# Patient Record
Sex: MALE | Race: WHITE | Employment: OTHER | URBAN - METROPOLITAN AREA
[De-identification: names, ages, dates, MRNs, and addresses within clinical notes are randomized per-mention and may not be internally consistent; named-entity substitution may affect disease eponyms.]

---

## 2022-10-29 ENCOUNTER — HOSPITAL ENCOUNTER (OUTPATIENT)
Age: 32
Discharge: HOME | End: 2022-10-29
Attending: EMERGENCY MEDICINE
Payer: COMMERCIAL

## 2022-10-29 VITALS
DIASTOLIC BLOOD PRESSURE: 95 MMHG | TEMPERATURE: 98 F | HEART RATE: 80 BPM | OXYGEN SATURATION: 98 % | RESPIRATION RATE: 20 BRPM | SYSTOLIC BLOOD PRESSURE: 130 MMHG | BODY MASS INDEX: 34.72 KG/M2 | HEIGHT: 71 IN | WEIGHT: 248 LBS

## 2022-10-29 DIAGNOSIS — S39.012A LUMBAR STRAIN, INITIAL ENCOUNTER: Primary | ICD-10-CM

## 2022-10-29 PROCEDURE — 99203 OFFICE O/P NEW LOW 30 MIN: CPT

## 2022-10-29 RX ORDER — CYCLOBENZAPRINE HCL 10 MG
10 TABLET ORAL 3 TIMES DAILY PRN
Qty: 20 TABLET | Refills: 0 | Status: SHIPPED | OUTPATIENT
Start: 2022-10-29 | End: 2022-11-05

## 2022-10-29 RX ORDER — LIDOCAINE 50 MG/G
2 PATCH TOPICAL EVERY 24 HOURS
Qty: 14 PATCH | Refills: 0 | Status: SHIPPED | OUTPATIENT
Start: 2022-10-29 | End: 2022-11-05

## 2022-10-29 NOTE — DISCHARGE INSTRUCTIONS
Take Motrin, Tylenol. And the lidocaine patches. If you still having pain start the Flexeril. Follow-up with a primary care physician return if any numbness weakness loss of urine or bowel.

## 2022-10-29 NOTE — ED INITIAL ASSESSMENT (HPI)
Patient reports lower back pain since Thursday afternoon, stating it feels like it \"locks up on him\". Reports cough, cold, fever Mon-Wed, resolving at this time. Reports pain worse with movements and bending, all across lower back. Taking advil for pain. Reports having similar symptoms 2 months ago. Reports pain can radiate down into hips and legs at times with numbness/tingling feeling.

## 2024-12-03 ENCOUNTER — APPOINTMENT (OUTPATIENT)
Dept: ULTRASOUND IMAGING | Age: 34
End: 2024-12-03
Attending: EMERGENCY MEDICINE
Payer: COMMERCIAL

## 2024-12-03 ENCOUNTER — HOSPITAL ENCOUNTER (EMERGENCY)
Age: 34
Discharge: HOME | End: 2024-12-03
Attending: EMERGENCY MEDICINE
Payer: COMMERCIAL

## 2024-12-03 VITALS
OXYGEN SATURATION: 99 % | BODY MASS INDEX: 35.84 KG/M2 | RESPIRATION RATE: 16 BRPM | TEMPERATURE: 98 F | HEART RATE: 77 BPM | WEIGHT: 256 LBS | DIASTOLIC BLOOD PRESSURE: 94 MMHG | SYSTOLIC BLOOD PRESSURE: 146 MMHG | HEIGHT: 71 IN

## 2024-12-03 DIAGNOSIS — I82.442 ACUTE DEEP VEIN THROMBOSIS (DVT) OF LEFT TIBIAL VEIN (HCC): Primary | ICD-10-CM

## 2024-12-03 LAB
ALBUMIN SERPL-MCNC: 4.6 G/DL (ref 3.2–4.8)
ALBUMIN/GLOB SERPL: 1.6 {RATIO} (ref 1–2)
ALP LIVER SERPL-CCNC: 92 U/L
ALT SERPL-CCNC: 68 U/L
ANION GAP SERPL CALC-SCNC: 3 MMOL/L (ref 0–18)
APTT PPP: 28.9 SECONDS (ref 23–36)
AST SERPL-CCNC: 40 U/L (ref ?–34)
BASOPHILS # BLD AUTO: 0.04 X10(3) UL (ref 0–0.2)
BASOPHILS NFR BLD AUTO: 0.4 %
BILIRUB SERPL-MCNC: 0.3 MG/DL (ref 0.3–1.2)
BUN BLD-MCNC: 17 MG/DL (ref 9–23)
CALCIUM BLD-MCNC: 9.9 MG/DL (ref 8.7–10.4)
CHLORIDE SERPL-SCNC: 107 MMOL/L (ref 98–112)
CO2 SERPL-SCNC: 28 MMOL/L (ref 21–32)
CREAT BLD-MCNC: 1.08 MG/DL
EGFRCR SERPLBLD CKD-EPI 2021: 92 ML/MIN/1.73M2 (ref 60–?)
EOSINOPHIL # BLD AUTO: 0.08 X10(3) UL (ref 0–0.7)
EOSINOPHIL NFR BLD AUTO: 0.7 %
ERYTHROCYTE [DISTWIDTH] IN BLOOD BY AUTOMATED COUNT: 12.6 %
GLOBULIN PLAS-MCNC: 2.8 G/DL (ref 2–3.5)
GLUCOSE BLD-MCNC: 107 MG/DL (ref 70–99)
HCT VFR BLD AUTO: 46.9 %
HGB BLD-MCNC: 16.3 G/DL
IMM GRANULOCYTES # BLD AUTO: 0.03 X10(3) UL (ref 0–1)
IMM GRANULOCYTES NFR BLD: 0.3 %
INR BLD: 0.9 (ref 0.8–1.2)
LYMPHOCYTES # BLD AUTO: 3.87 X10(3) UL (ref 1–4)
LYMPHOCYTES NFR BLD AUTO: 36 %
MCH RBC QN AUTO: 31.1 PG (ref 26–34)
MCHC RBC AUTO-ENTMCNC: 34.8 G/DL (ref 31–37)
MCV RBC AUTO: 89.5 FL
MONOCYTES # BLD AUTO: 0.85 X10(3) UL (ref 0.1–1)
MONOCYTES NFR BLD AUTO: 7.9 %
NEUTROPHILS # BLD AUTO: 5.89 X10 (3) UL (ref 1.5–7.7)
NEUTROPHILS # BLD AUTO: 5.89 X10(3) UL (ref 1.5–7.7)
NEUTROPHILS NFR BLD AUTO: 54.7 %
OSMOLALITY SERPL CALC.SUM OF ELEC: 288 MOSM/KG (ref 275–295)
PLATELET # BLD AUTO: 266 10(3)UL (ref 150–450)
POTASSIUM SERPL-SCNC: 3.6 MMOL/L (ref 3.5–5.1)
PROT SERPL-MCNC: 7.4 G/DL (ref 5.7–8.2)
PROTHROMBIN TIME: 12 SECONDS (ref 11.6–14.8)
RBC # BLD AUTO: 5.24 X10(6)UL
SODIUM SERPL-SCNC: 138 MMOL/L (ref 136–145)
WBC # BLD AUTO: 10.8 X10(3) UL (ref 4–11)

## 2024-12-03 PROCEDURE — 85610 PROTHROMBIN TIME: CPT | Performed by: PHYSICIAN ASSISTANT

## 2024-12-03 PROCEDURE — 99284 EMERGENCY DEPT VISIT MOD MDM: CPT

## 2024-12-03 PROCEDURE — 80053 COMPREHEN METABOLIC PANEL: CPT | Performed by: PHYSICIAN ASSISTANT

## 2024-12-03 PROCEDURE — 99285 EMERGENCY DEPT VISIT HI MDM: CPT

## 2024-12-03 PROCEDURE — 85025 COMPLETE CBC W/AUTO DIFF WBC: CPT | Performed by: PHYSICIAN ASSISTANT

## 2024-12-03 PROCEDURE — 85730 THROMBOPLASTIN TIME PARTIAL: CPT | Performed by: PHYSICIAN ASSISTANT

## 2024-12-03 PROCEDURE — 36415 COLL VENOUS BLD VENIPUNCTURE: CPT

## 2024-12-03 PROCEDURE — 93971 EXTREMITY STUDY: CPT | Performed by: EMERGENCY MEDICINE

## 2024-12-04 ENCOUNTER — TELEPHONE (OUTPATIENT)
Dept: HEMATOLOGY/ONCOLOGY | Facility: HOSPITAL | Age: 34
End: 2024-12-04

## 2024-12-04 NOTE — ED INITIAL ASSESSMENT (HPI)
Pt c/o pain behind left knee. Had similar episode 2 months ago that resolved on it's own.  Pain returned about 1 week ago.  Pt denies specific injury. Pain radiates down to calf at times.

## 2024-12-04 NOTE — DISCHARGE INSTRUCTIONS
Follow directions for new blood thinner closely.  Referral to both primary care physician and hematologist to arrange follow-up.  For any acute chest pain or shortness of breath return to the ER.

## 2024-12-04 NOTE — ED PROVIDER NOTES
Patient Seen in: [facility] [place]      History     Chief Complaint   Patient presents with    Leg Pain     Stated Complaint: Left leg pain - denies injury - 4 200mg ibuprofen at 1830    Subjective:   HPI      34-year-old male.  No significant medical history.  Someday smoker.  Admits that he has not seen a point in his adult life.  2 months prior to arrival the patient had 1 to 2 weeks of left calf pain.  Nonspecific.  Eventually resolved.  However, symptoms returned these last 10 days.  Again, has no obvious explanation.  No injury.  No recent travel.  Denies any chest pain or shortness of breath.    Objective:     History reviewed. No pertinent past medical history.           History reviewed. No pertinent surgical history.             Social History     Socioeconomic History    Marital status:    Tobacco Use    Smoking status: Never     Passive exposure: Never    Smokeless tobacco: Never   Vaping Use    Vaping status: Some Days   Substance and Sexual Activity    Alcohol use: Yes     Comment: occasional    Drug use: Never                  Physical Exam     ED Triage Vitals [12/03/24 2019]   BP (!) 158/98   Pulse 84   Resp 18   Temp 97.7 °F (36.5 °C)   Temp src Temporal   SpO2 97 %   O2 Device None (Room air)       Current Vitals:   Vital Signs  BP: (!) 132/104  Pulse: 81  Resp: 18  Temp: 97.7 °F (36.5 °C)  Temp src: Temporal    Oxygen Therapy  SpO2: 99 %  O2 Device: None (Room air)        Physical Exam     Gen: Well appearing, well groomed, alert and aware x 3  Neck: Supple, full range of motion  Eye examination: EOMs are intact, normal conjunctival  ENT: Atraumatic  Lung: No distress, RR, no retraction  Extremities: Subtle palpable fullness through the left medial calf without erythema or induration.  Strong dorsal pedis pulse.    ED Course     Labs Reviewed   COMP METABOLIC PANEL (14) - Abnormal; Notable for the following components:       Result Value    Glucose 107 (*)     AST  40 (*)     ALT 68 (*)     All other components within normal limits   PTT, ACTIVATED - Normal   PROTHROMBIN TIME (PT) - Normal   CBC WITH DIFFERENTIAL WITH PLATELET     US VENOUS DOPPLER LEG LEFT - DIAG IMG (CPT=93971)    Result Date: 12/3/2024  PROCEDURE:  US VENOUS DOPPLER LEG LEFT - DIAG IMG (CPT=93971)  COMPARISON:  None.  INDICATIONS:  eval for DVT  TECHNIQUE:  Real time, grey scale, and duplex ultrasound was used to evaluate the lower extremity venous system. B-mode two-dimensional images of the vascular structures, Doppler spectral analysis, and color flow.  Doppler imaging were performed.  The following veins were imaged:  Common, deep, and superficial femoral, popliteal, sapheno-femoral junction, posterior tibial veins, and the contralateral common femoral vein.  PATIENT STATED HISTORY: (As transcribed by Technologist)  Patient presents with left leg pain behind the knee for a few months.    FINDINGS:  EXTREMITY EXAMINED:  Left lower extremity SAPHENOFEMORAL JUNCTION:  No reflux. THROMBI:  Occlusive thrombus in 1 of the paired posterior tibial veins in the left lower extremity is noted.  There is no evidence of DVT in the left superficial and popliteal veins.            CONCLUSION:  Deep vein thrombosis in 1 of the paired left posterior tibial veins.  This report was communicated by telephone to Dr. Pierre at the dictation time shown below.   LOCATION:  [facility]    Dictated by (CST): Branden Cooper MD on 12/03/2024 at 9:35 PM     Finalized by (CST): Branden Cooper MD on 12/03/2024 at 9:36 PM             MDM    My supervising physician was involved in the management of this patient.    Blood pressure 139/109.  Afebrile.  No tachycardia.  No complaint of chest pain shortness of breath    Patient sent for ultrasound left lower extremity    CONCLUSION:  Deep vein thrombosis in 1 of the paired left posterior tibial veins.  This report was communicated by telephone to Dr. Pierre at the dictation time shown below.    LOCATION:  [facility]    Dictated by (CST): Branden Cooper MD on 12/03/2024 at 9:35 PM     Finalized by (CST): Branden Cooper MD on 12/03/2024 at 9:36 PM       Ultrasound as above.  Notified supervising physician.  CBC, CMP and coags.  We will initiate anticoagulants and refer her to both primary care physician and hematology     CBC benign    AST and ALT subtly elevated    Glucose 107    Coags within normal limits    Xarelto starter pack    Follow directions for new blood thinner closely.  Referral to both primary care physician and hematologist to arrange follow-up.  For any acute chest pain or shortness of breath return to the ER.    Medical Decision Making      Disposition and Plan     Clinical Impression:  1. Acute deep vein thrombosis (DVT) of left tibial vein (HCC)         Disposition:  Discharge  12/3/2024 10:58 pm    Follow-up:  Rajeev Cool MD  [address] [facility] [id number] [phone]    Follow up      Lyndsay Torrez MD  [address] [place] [id number] [phone]                Medications Prescribed:  Current Discharge Medication List        START taking these medications    Details   rivaroxaban 15 & 20 MG Oral Tablet Therapy Pack Days 1-21: 15 mg by mouth twice daily, Days 22-30: 20 mg by mouth once daily  Qty: 1 each, Refills: 0                 Supplementary Documentation:

## 2024-12-04 NOTE — TELEPHONE ENCOUNTER
New consult for Acute deep vein thrombosis (DVT) of left tibial vein (HCC). Patient was seen in ED on 12/3 and was told to follow up with Dr. Torrez. Please call patient to schedule new consult appointment at [phone]. Called 12/4/24 GA

## 2024-12-06 ENCOUNTER — HOSPITAL ENCOUNTER (EMERGENCY)
Facility: HOSPITAL | Age: 34
Discharge: HOME | End: 2024-12-06
Attending: EMERGENCY MEDICINE
Payer: COMMERCIAL

## 2024-12-06 VITALS
TEMPERATURE: 98 F | HEIGHT: 71 IN | OXYGEN SATURATION: 97 % | WEIGHT: 256 LBS | SYSTOLIC BLOOD PRESSURE: 140 MMHG | HEART RATE: 91 BPM | DIASTOLIC BLOOD PRESSURE: 98 MMHG | BODY MASS INDEX: 35.84 KG/M2 | RESPIRATION RATE: 18 BRPM

## 2024-12-06 DIAGNOSIS — I82.432 ACUTE DEEP VEIN THROMBOSIS (DVT) OF POPLITEAL VEIN OF LEFT LOWER EXTREMITY (HCC): Primary | ICD-10-CM

## 2024-12-06 PROCEDURE — 99283 EMERGENCY DEPT VISIT LOW MDM: CPT

## 2024-12-06 PROCEDURE — 99284 EMERGENCY DEPT VISIT MOD MDM: CPT

## 2024-12-06 RX ORDER — HYDROCODONE BITARTRATE AND ACETAMINOPHEN 5; 325 MG/1; MG/1
1 TABLET ORAL EVERY 6 HOURS PRN
Qty: 10 TABLET | Refills: 0 | Status: SHIPPED | OUTPATIENT
Start: 2024-12-06 | End: 2024-12-16

## 2024-12-06 NOTE — ED QUICK NOTES
Patient reports with a DVT to left leg, patient is taking his blood thinner, denies chest pain or shortness of breath, reports the pain is severe.

## 2024-12-06 NOTE — ED INITIAL ASSESSMENT (HPI)
Pt was at the PED on Tuesday and was Dx'ed with a DVT. Today the pain in his left leg is so bad that he called off work and he could not sleep last night. Pt denies CP and REGGIE. Pt did say that he had the taste of blood in his mouth this morning, and when he spit it out is was a little pink tinge to it.

## 2024-12-06 NOTE — ED PROVIDER NOTES
Patient Seen in: [facility] Emergency Department      History     Chief Complaint   Patient presents with    Pain     Stated Complaint: left lower extremity DVT diagnosed on Tuesday, patient on Xarelto BID. C/O pain*    Subjective:   HPI      This is a 34-year-old male no significant past medical history who was diagnosed on 12/3/2024 with a left leg DVT in the left posterior tibial vein.  He was seen in the emergency room and started on Xarelto.  Patient states he has pain in the left leg where the DVT is and he is having trouble sleeping.  He is a very active gentleman.  He works at [employer].  He is on his feet all day.  He has had no recent travel.  He thinks there might be some type of family history of clotting in his grandfather but he is not really sure.  He is already made an appointment with Dr. Torrez from hematology for December 17.  He was taking Tylenol sporadically for pain.  He presents here now for further evaluation.    Objective:     Past Medical History:    DVT (deep venous thrombosis) (HCC)              History reviewed. No pertinent surgical history.             Social History     Socioeconomic History    Marital status:    Tobacco Use    Smoking status: Never     Passive exposure: Never    Smokeless tobacco: Never   Vaping Use    Vaping status: Some Days   Substance and Sexual Activity    Alcohol use: Yes     Comment: occasional    Drug use: Not Currently     Types: Cannabis                  Physical Exam     ED Triage Vitals [12/06/24 0937]   BP (!) 155/99   Pulse 75   Resp 18   Temp 97.7 °F (36.5 °C)   Temp src Temporal   SpO2 97 %   O2 Device None (Room air)       Current Vitals:   Vital Signs  BP: (!) 140/98  Pulse: 91  Resp: 18  Temp: 97.7 °F (36.5 °C)  Temp src: Temporal  MAP (mmHg): (!) 110    Oxygen Therapy  SpO2: 97 %  O2 Device: None (Room air)        Physical Exam        GENERAL: Awake, alert oriented x3, nontoxic appearing.   SKIN: Normal, warm, and dry.  Lungs: Clear to  auscultation bilaterally with no rales, no retractions, and no wheezing.  HEART:  Regular rate and rhythm. S1 and S2. No murmurs, no rubs or gallops.   ABDOMEN: Soft, nontender and nondistended. Normoactive bowel sounds. No rebound. No guarding.   EXTREMITIES: Tenderness over the left popliteal fossa and proximal calf.  Left leg is slightly more edematous than right.  Warm with brisk capillary refill.                     ED Course   Labs Reviewed - No data to display                MDM           34-year-old male otherwise healthy presents with known DVT in left leg with some calf pain.    Discussed supportive care.  He can also take Norco at bedtime.  Return if worsening symptoms chest pain or shortness of breath.  Follow-up with Dr. Torrez as scheduled.  Patient was discharged home in good condition.        Disposition and Plan     Clinical Impression:  1. Acute deep vein thrombosis (DVT) of popliteal vein of left lower extremity (HCC)         Disposition:  Discharge  12/6/2024  9:51 am    Follow-up:  Lyndsay Torrez MD  [address]   [facility] [id number] [phone]    Follow up on 12/17/2024      Paige Kincaid DO  [address]  [facility] [address] [phone]    Follow up in 1 week(s)            Medications Prescribed:  Current Discharge Medication List        START taking these medications    Details   HYDROcodone-acetaminophen 5-325 MG Oral Tab Take 1 tablet by mouth every 6 (six) hours as needed for Pain.  Qty: 10 tablet, Refills: 0    Associated Diagnoses: Acute deep vein thrombosis (DVT) of popliteal vein of left lower extremity (HCC)                 Supplementary Documentation:

## 2024-12-06 NOTE — DISCHARGE INSTRUCTIONS
Continue Xarelto as prescribed  Tylenol for pain every 4-6 hours  Norco at bedtime with 1 Tylenol  Warm compresses topically or heat patches for discomfort  Elevate legs when you can  Lidocaine patches topically  Return if worse if you have chest pain or shortness of breath  Follow-up with hematology as scheduled

## 2024-12-12 ENCOUNTER — OFFICE VISIT (OUTPATIENT)
Dept: FAMILY MEDICINE CLINIC | Facility: CLINIC | Age: 34
End: 2024-12-12
Payer: COMMERCIAL

## 2024-12-12 VITALS
HEIGHT: 71 IN | RESPIRATION RATE: 18 BRPM | HEART RATE: 78 BPM | TEMPERATURE: 99 F | SYSTOLIC BLOOD PRESSURE: 136 MMHG | OXYGEN SATURATION: 99 % | WEIGHT: 265 LBS | BODY MASS INDEX: 37.1 KG/M2 | DIASTOLIC BLOOD PRESSURE: 84 MMHG

## 2024-12-12 DIAGNOSIS — I82.442 ACUTE DEEP VEIN THROMBOSIS (DVT) OF TIBIAL VEIN OF LEFT LOWER EXTREMITY (HCC): Primary | ICD-10-CM

## 2024-12-12 DIAGNOSIS — Z28.21 REFUSED INFLUENZA VACCINE: ICD-10-CM

## 2024-12-12 PROBLEM — M54.42 LUMBAGO WITH SCIATICA, LEFT SIDE: Status: RESOLVED | Noted: 2024-12-12 | Resolved: 2024-12-12

## 2024-12-12 PROCEDURE — 99203 OFFICE O/P NEW LOW 30 MIN: CPT | Performed by: FAMILY MEDICINE

## 2024-12-12 NOTE — PATIENT INSTRUCTIONS
Continue the Xarelto as prescribed.    Elevate your leg when sitting.     Stay active during the day.    Keep your appointment with the hematologist as scheduled on 1/6/2025.    If you develop a nose bleed or have a cut which won't stop bleeding after 30 minutes of direct pressure, you have blood in the urine or stool, you develop sudden chest pain or shortness of breath or you fall and hit your head, you must be seen in an ER.    Consider getting your flu shot and COVID booster at your local pharmacy.    See me in 4 weeks for your annual physical.

## 2024-12-12 NOTE — PROGRESS NOTES
The 21st Century Cures Act makes medical notes like these available to patients in the interest of transparency. Please be advised this is a medical document. Medical documents are intended to carry relevant information, facts as evident, and the clinical opinion of the practitioner. The medical note is intended as peer to peer communication and may appear blunt or direct. It is written in medical language and may contain abbreviations or verbiage that are unfamiliar.       Leonardo Escobar is a 34 year old male.    HPI:     Chief Complaint   Patient presents with    ER F/U       This 34-year-old male who is a new patient to my practice presents to the office for follow-up of an ER visit dated 12/3/2024 and 12/6/2024.  On 12/3/2024, the patient presented with left calf pain.  He states prior to the emergency room visit, he had had left calf pain for 1 to 2 weeks for about 2 months.  The patient states the pain resolved and then recurred about 10 days prior to his ER visit.  He denied any injury.  There was no associated prolonged travel history or surgery.  He had no associated chest pain or shortness of breath.  He had no prior history for DVT and there is no family history for DVT or PE.  Ultrasound done on that date showed DVT in one of the paired left posterior tibial veins.  The patient was started on Xarelto.  He presented to the emergency room again on 12/6/2024 with worsening left calf pain and difficulty sleeping.  The patient was supposed to be seen by hematology on 12/17/2024.  Assessment showed no significant change in his exam and he was discharged to home with [place].  The patient states since his discharge on 12/6/2024, the pain has improved.  He is taking Tylenol for pain.  He states he has not used any of the Norco.  He is applying a lidocaine patch and he has been elevating his leg.  He works at [employer] and does a lot of walking.  He is still denying any chest pain, shortness of breath or any calf  swelling or redness.    HISTORY:  Past Medical History:    DVT (deep venous thrombosis) ([facility])    Lumbago with sciatica, left side      History reviewed. No pertinent surgical history.   Family History   Problem Relation Age of Onset    Diabetes Mother     Other (Other) Father         gout    Heart Disease Maternal Grandfather       Social History:   Social History     Socioeconomic History    Marital status:    Tobacco Use    Smoking status: Never     Passive exposure: Never    Smokeless tobacco: Never   Vaping Use    Vaping status: Some Days   Substance and Sexual Activity    Alcohol use: Yes     Comment: occasional    Drug use: Not Currently     Types: Cannabis    Sexual activity: Yes        Medications (Active prior to today's visit):  Current Outpatient Medications   Medication Sig Dispense Refill    HYDROcodone-acetaminophen 5-325 MG Oral Tab Take 1 tablet by mouth every 6 (six) hours as needed for Pain. 10 tablet 0    rivaroxaban 15 & 20 MG Oral Tablet Therapy Pack Days 1-21: 15 mg by mouth twice daily, Days 22-30: 20 mg by mouth once daily 1 each 0       Allergies:  Allergies[1]    ROS:     Pertinent positives and pertinent negatives are as listed in HPI.    All other review of symptoms were reviewed and negative.    PHYSICAL EXAM:   /84 (BP Location: Left arm, Patient Position: Sitting, Cuff Size: large)   Pulse 78   Temp 99 °F (37.2 °C)   Resp 18   Ht 5' 11\" (1.803 m)   Wt 265 lb (120.2 kg)   SpO2 99%   BMI 36.96 kg/m²     Wt Readings from Last 3 Encounters:   12/12/24 265 lb (120.2 kg)   12/06/24 256 lb (116.1 kg)   12/03/24 256 lb (116.1 kg)       BP Readings from Last 3 Encounters:   12/12/24 136/84   12/06/24 (!) 140/98   12/03/24 (!) 146/94       General: Obese, well hydrated. No acute distress. No pallor.   HEENT: Normocephalic, atraumatic.  RODRIGUE, EOMI, Sclera clear and non icteric bilaterally. TM's normal, nose without congestion, pharynx without redness or exudates. Moist mucous  membranes.  Neck: Supple. No lymphadenopathy. No thyromegaly. No bruits noted.  Heart: RRR without S3 or S4 or murmur.  Lungs: Clear to auscultation bilaterally. No rales, rhonchi or wheezes. No tachypnea or retractions noted.  Abdomen: Soft, nontender, nondistended, normal bowel sounds. No organomegaly. No guarding, rigidity or rebound noted.  Extremities: No edema bilaterally.  The patient has no left calf redness or swelling.  No Homans' sign.  Some mild tenderness to palpation.  Skin: Warm and dry.  Neuro: Alert and oriented x 3, normal gait.  Psych: Normal mood and affect.     ASSESSMENT/PLAN:   34 year old male with    LABS This Visit:    Results for orders placed or performed during the hospital encounter of 12/03/24   CBC With Differential With Platelet    Collection Time: 12/03/24  9:56 PM   Result Value Ref Range    WBC 10.8 4.0 - 11.0 x10(3) uL    RBC 5.24 4.30 - 5.70 x10(6)uL    HGB 16.3 13.0 - 17.5 g/dL    HCT 46.9 39.0 - 53.0 %    .0 150.0 - 450.0 10(3)uL    MCV 89.5 80.0 - 100.0 fL    MCH 31.1 26.0 - 34.0 pg    MCHC 34.8 31.0 - 37.0 g/dL    RDW 12.6 %    Neutrophil Absolute Prelim 5.89 1.50 - 7.70 x10 (3) uL    Neutrophil Absolute 5.89 1.50 - 7.70 x10(3) uL    Lymphocyte Absolute 3.87 1.00 - 4.00 x10(3) uL    Monocyte Absolute 0.85 0.10 - 1.00 x10(3) uL    Eosinophil Absolute 0.08 0.00 - 0.70 x10(3) uL    Basophil Absolute 0.04 0.00 - 0.20 x10(3) uL    Immature Granulocyte Absolute 0.03 0.00 - 1.00 x10(3) uL    Neutrophil % 54.7 %    Lymphocyte % 36.0 %    Monocyte % 7.9 %    Eosinophil % 0.7 %    Basophil % 0.4 %    Immature Granulocyte % 0.3 %   Comp Metabolic Panel (14)    Collection Time: 12/03/24  9:56 PM   Result Value Ref Range    Glucose 107 (H) 70 - 99 mg/dL    Sodium 138 136 - 145 mmol/L    Potassium 3.6 3.5 - 5.1 mmol/L    Chloride 107 98 - 112 mmol/L    CO2 28.0 21.0 - 32.0 mmol/L    Anion Gap 3 0 - 18 mmol/L    BUN 17 9 - 23 mg/dL    Creatinine 1.08 0.70 - 1.30 mg/dL    Calcium,  Total 9.9 8.7 - 10.4 mg/dL    Calculated Osmolality 288 275 - 295 mOsm/kg    eGFR-Cr 92 >=60 mL/min/1.73m2    AST 40 (H) <34 U/L    ALT 68 (H) 10 - 49 U/L    Alkaline Phosphatase 92 45 - 117 U/L    Bilirubin, Total 0.3 0.3 - 1.2 mg/dL    Total Protein 7.4 5.7 - 8.2 g/dL    Albumin 4.6 3.2 - 4.8 g/dL    Globulin  2.8 2.0 - 3.5 g/dL    A/G Ratio 1.6 1.0 - 2.0   PTT, Activated    Collection Time: 12/03/24  9:56 PM   Result Value Ref Range    PTT 28.9 23.0 - 36.0 seconds   Prothrombin Time (PT)    Collection Time: 12/03/24  9:56 PM   Result Value Ref Range    PT 12.0 11.6 - 14.8 seconds    INR 0.90 0.80 - 1.20        1. Acute deep vein thrombosis (DVT) of tibial vein of left lower extremity (HCC)    I discussed management of DVT with the patient.  The patient is to continue with his Xarelto.  The patient should keep his appointment as scheduled with hematology on 1/6/2025.  He was reminded he cannot take any NSAIDs or aspirin while he is on Xarelto.  He may continue with the Tylenol and lidocaine patches as needed for his pain.  He should continue to elevate the leg when sitting.  He was instructed to go to the emergency room if he is having worsening calf pain or swelling, if he has a nosebleed or a cut which does not stop bleeding after 30 minutes of direct pressure, he has blood in the urine or stool, sudden onset of chest pain or shortness of breath, or if he falls and hits his head.    2. Refused influenza vaccine    The patient declined his flu shot today.  I encouraged him to get his flu shot and COVID booster at his local pharmacy.    - INFLUENZA REFUSED [facility]     3.  Health maintenance    The patient should return in 4 weeks for his annual wellness exam.    Health Maintenance:    Health Maintenance   Topic Date Due    Annual Physical  Never done    Annual Depression Screening  Never done    COVID-19 Vaccine (3 - 2024-25 season) 12/12/2025    Influenza Vaccine (1) 06/30/2025    DTaP,Tdap,and Td Vaccines (2 - Td or  Tdap) 07/22/2029    Pneumococcal Vaccine: Birth to 64yrs  Aged Out         Meds This Visit:  Requested Prescriptions      No prescriptions requested or ordered in this encounter       Imaging & Referrals:  INFLUENZA REFUSED [facility]     Patient understands plan and follow-up.  Follow up in 4 weeks for annual wellness exam. Keep appointment with hematology as scheduled on 1/6/2025.    12/12/2024  Paige Kincaid DO    Total time: 30 minutes including precharting, H&P, plan of care    This dictation was performed with a verbal recognition program (DRAGON) and it was checked for errors. It is possible that there are still dictated errors within this office note. If so, please bring any errors to my attention for an addendum. All efforts were made to ensure that this office note is accurate           [1] No Known Allergies

## 2024-12-17 ENCOUNTER — APPOINTMENT (OUTPATIENT)
Dept: HEMATOLOGY/ONCOLOGY | Facility: HOSPITAL | Age: 34
End: 2024-12-17
Attending: INTERNAL MEDICINE
Payer: COMMERCIAL

## 2025-01-05 NOTE — PROGRESS NOTES
Hematology/Oncology Initial Consultation Note    Patient Name: Leonardo Escobar  Medical Record Number: [id number]    YOB: 1990   Date of Consultation: 1/5/2025   Physician requesting consultation: Zak Caldwell MD    Reason for Consultation:  Leonardo Escobar was seen today for the diagnosis of DVT.    =============================    History of Present Illness:  Mr. Escobar is a pleasant 33 y/o M with no significant PMHx who presents to hematology/oncology for initial visit -for evaluation and management of left posterior tibial vein DVT.     On 12/3/24 he presented to [facility] in [place] with nonspecific left calf pain for about 2 months. The pain was behind the left knee, left calf, and also behind left knee. Denied erythema or swelling. They resolved spontaneously however symptoms returned after 4 days. He had left calf pain for about 10 days before he went to ED.     Prior to DVT, he denies injury to E, recent travel, hospitalizations, surgery, or inciting factor. He had no dyspnea or hypoxia on presentation.     US LLE Venous Duplex 12-3-24 showed deep vein thrombosis in 1 of the paired left posterior tibial veins. CBC with diff was normal. D-dimer was not drawn. He was started on Xarelto (starter package) and discharged home with follow-up with hematology.     His BMI is 37. He takes gummies (marijuana) occasionally. He does not smoke tobacco (never smoker). Used to smoke marijuana until Aug 2024. Denies testosterone or anabolic steroid use. He takes a daily multivitamin.     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 His ECOG is 0 and he is completely independent. He stays active at home a lot. He keeps pretty busy with his two little girls ages 6-9.     He has been taking Xarelto daily and has had no issues with it. Denies melena, hematochezia, rectal bleeding, or hematuria. He recently has had a productive white cough but no fevers chills. His pain in LLE is almost completely resolved  (used to be 10/10 but now is 1/10).     There is no family history of VTE. No family history of thrombophilia. This is his first episode of blood clot.       Past Medical History:  Past Medical History:    DVT (deep venous thrombosis) (HCC)    Lumbago with sciatica, left side     No past surgical history on file.  Home Medications:  Medications Ordered Prior to Encounter[1]    Allergies:   Allergies[2]    Psychosocial History:  Social History     Social History Narrative    Not on file     Social History     Socioeconomic History    Marital status:    Tobacco Use    Smoking status: Never     Passive exposure: Never    Smokeless tobacco: Never   Vaping Use    Vaping status: Some Days   Substance and Sexual Activity    Alcohol use: Yes     Comment: occasional    Drug use: Not Currently     Types: Cannabis    Sexual activity: Yes     Family Medical History:  Family History   Problem Relation Age of Onset    Diabetes Mother     Other (Other) Father         gout    Heart Disease Maternal Grandfather      Review of Systems:  A 10-point ROS was done with pertinent positives and negative per the HPI    Vital Signs:  Height: --  Weight: 116.1 kg (256 lb) (01/06 0801)  BSA (Calculated - sq m): --  Pulse: 67 (01/06 0801)  BP: 140/88 (01/06 0801)  Temp: 97.6 °F (36.4 °C) (01/06 0801)  Do Not Use - Resp Rate: --  SpO2: 98 % (01/06 0801)    Wt Readings from Last 6 Encounters:   12/12/24 120.2 kg (265 lb)   12/06/24 116.1 kg (256 lb)   12/03/24 116.1 kg (256 lb)   10/29/22 112.5 kg (248 lb)     ECOG PS: 0    Physical Examination:  General: Patient is alert and oriented, not in acute distress  Psych: Mood and affect are appropriate  Eyes: EOMI, bilateral conjunctiva normal  ENT: Oropharynx is clear  CV: Regular rate and rhythm, no murmurs, no LE edema  Respiratory: Lungs clear to auscultation bilaterally  GI/Abd: Soft, non-tender with normoactive bowel sounds, no hepatosplenomegaly  Heme: normal capillary refill, no  ecchymosis, no petechiae, no purpura  Lymphatics: No enlarged or palpable cervical, occipital, supraclavicular, or infraclavicular lymph nodes  Neurological: Grossly intact   Skin: no rashes or skin lesions      Laboratory:  No results for input(s): \"WBC\", \"HGB\", \"HCT\", \"PLT\", \"MCV\", \"RDW\", \"NEPRELIM\" in the last 168 hours.  No results for input(s): \"NA\", \"K\", \"CL\", \"CO2\", \"BUN\", \"CREATSERUM\", \"GFRAA\", \"GFRNAA\", \"GLU\", \"CA\", \"PHOS\", \"TP\", \"ALB\", \"ALKPHO\", \"AST\", \"ALT\", \"BILT\" in the last 168 hours.    Invalid input(s): \"MAG\"  No results for input(s): \"PT\", \"INR\", \"PTT\", \"FIB\" in the last 168 hours.      Imaging:    US VENOUS DOPPLER LEG LEFT - DIAG IMG (CPT=93971)    Result Date: 12/3/2024  CONCLUSION:  Deep vein thrombosis in 1 of the paired left posterior tibial veins.  This report was communicated by telephone to Dr. Pierre at the dictation time shown below.   LOCATION:  [place]    Dictated by (CST): Branden Cooper MD on 12/03/2024 at 9:35 PM     Finalized by (CST): Branden Cooper MD on 12/03/2024 at 9:36 PM            Impression & Plan: Mr. Escobar is a pleasant 33 y/o M with no significant PMHx who presents to hematology/oncology for initial visit -for evaluation and management of left posterior tibial vein DVT.     Left Posterior Tibial Vein DVT - Unprovoked first VTE   - 12/3/24 he presented to [facility] in [place] with nonspecific left calf pain for about 2 months. The pain was behind the left knee, left calf, and also behind left knee. Denied erythema or swelling. They resolved spontaneously however symptoms returned after 4 days. He had left calf pain for about 10 days before he went to ED.   - Prior to DVT, he denies injury to LLE, recent travel, hospitalizations, surgery, or inciting factor. He had no dyspnea or hypoxia on presentation.   - US LLE Venous Duplex 12-3-24 showed deep vein thrombosis in 1 of the paired left posterior tibial veins. CBC with diff was normal. D-dimer was not drawn. He was  started on Xarelto (starter package) and discharged home with follow-up with hematology.   - There is no family history of VTE. No family history of thrombophilia. This is his first episode of blood clot.   - He does not smoke tobacco (never smoker). Used to smoke marijuana until Aug 2024. Denies testosterone or anabolic steroid use. He takes a daily multivitamin.   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 His ECOG is 0 and he is completely independent. He stays active at home a lot. He keeps pretty busy with his two little girls ages 6-9.   - He has been taking Xarelto daily and has had no issues with it. Denies melena, hematochezia, rectal bleeding, or hematuria. He recently has had a productive white cough but no fevers chills. His pain in LLE is almost completely resolved (used to be 10/10 but now is 1/10).     Plan:    Patient has no known risk factors for VTE. He was symptomatic with left lower extremity pain for about 2 months (no provoking factors as per extensive H&P above) prior to diagnosis. This is an unprovoked distal DVT, first episode of VTE. Even though it is distal, he had significant left popliteal pain (more proximal than where his clot was diagnosed) for quite some time prior to presenting to ED. W    Given that the patient is of Male Male sex with first episode of unprovoked DVT without identifiable risk factor, I recommend total 6 months of full dose anticoagulation (Xarelto 20 mg PO every day) for treatment of left posterior tibial vein DVT (EOT 6/13/25). Refills sent to pharmacy. Patient's bleeding risk is low and he is tolerating Xarelto 20 mg daily well with no significant adverse effects. After thorough discussion, he also prefers to be anticoagulated for extended period (after explaining risks vs benefits).     Start low-dose aspirin 81 mg PO daily for VTE prevention after 6 months of Xarelto is completed.     1 month after patient completes therapeutic dosage of  anticoagulation, we will repeat lower extremity (LLE) Ultrasound to assess clot burden. Duplex ordered today.     Advised he return to clinic mid/end of 2025 to see me for labs and visit.     I will complete the workup by drawing hypercoagulable testing 12 weeks after he finishes therapeutic Xarelto (around 25).     Patient agreed to plan and was appreciative of the recommendations. Will follow up with him in 2025.     Diamond Price, DO  [facility]         [1]   Current Outpatient Medications on File Prior to Visit   Medication Sig Dispense Refill    [] HYDROcodone-acetaminophen 5-325 MG Oral Tab Take 1 tablet by mouth every 6 (six) hours as needed for Pain. 10 tablet 0    rivaroxaban 15 & 20 MG Oral Tablet Therapy Pack Days 1-21: 15 mg by mouth twice daily, Days 22-30: 20 mg by mouth once daily 1 each 0     No current facility-administered medications on file prior to visit.   [2] No Known Allergies

## 2025-01-06 ENCOUNTER — OFFICE VISIT (OUTPATIENT)
Age: 35
End: 2025-01-06
Attending: INTERNAL MEDICINE
Payer: COMMERCIAL

## 2025-01-06 VITALS
DIASTOLIC BLOOD PRESSURE: 88 MMHG | HEART RATE: 67 BPM | SYSTOLIC BLOOD PRESSURE: 140 MMHG | RESPIRATION RATE: 18 BRPM | BODY MASS INDEX: 36 KG/M2 | OXYGEN SATURATION: 98 % | TEMPERATURE: 98 F | WEIGHT: 256 LBS

## 2025-01-06 DIAGNOSIS — I82.442 ACUTE DEEP VEIN THROMBOSIS (DVT) OF TIBIAL VEIN OF LEFT LOWER EXTREMITY (HCC): Primary | ICD-10-CM

## 2025-01-06 NOTE — PROGRESS NOTES
Patient is here for MD consult for DVT. Patient is on Xarelto daily. Tolerating well. Pain in left lower extremity improved with Tylenol. Minimal edema.       Education Record    Learner:  Patient    Disease / Diagnosis:  consult     Barriers / Limitations:  None   Comments:    Method:  Discussion   Comments:    General Topics:  Plan of care reviewed   Comments:    Outcome:  Shows understanding   Comments:

## 2025-01-06 NOTE — PATIENT INSTRUCTIONS
Wenceslao Patterson,    You were seen today for management of your deep vein thrombosis.    As we discussed, please do the followin- Take Xarelto 20 mg, one tablet, DAILY until you finish total of 6 months treatment. Last pill should be 2025.   2- After you finish the Xarelto, take one daily aspirin (81 mg, one tablet daily). This can be found at any pharmacy  3- Get ultrasound of your leg done end of 2025      Return to clinic to see me end of 2025 for labs and visit.    It was a pleasure meeting you,  Dr. Price

## 2025-01-14 ENCOUNTER — OFFICE VISIT (OUTPATIENT)
Dept: FAMILY MEDICINE CLINIC | Facility: CLINIC | Age: 35
End: 2025-01-14
Payer: COMMERCIAL

## 2025-01-14 VITALS
SYSTOLIC BLOOD PRESSURE: 118 MMHG | DIASTOLIC BLOOD PRESSURE: 80 MMHG | RESPIRATION RATE: 18 BRPM | BODY MASS INDEX: 36.12 KG/M2 | HEART RATE: 64 BPM | OXYGEN SATURATION: 99 % | TEMPERATURE: 98 F | HEIGHT: 71 IN | WEIGHT: 258 LBS

## 2025-01-14 DIAGNOSIS — R79.89 ELEVATED LFTS: ICD-10-CM

## 2025-01-14 DIAGNOSIS — Z00.00 WELLNESS EXAMINATION: Primary | ICD-10-CM

## 2025-01-14 DIAGNOSIS — J02.9 PHARYNGITIS, UNSPECIFIED ETIOLOGY: ICD-10-CM

## 2025-01-14 DIAGNOSIS — Z00.00 LABORATORY EXAM ORDERED AS PART OF ROUTINE GENERAL MEDICAL EXAMINATION: ICD-10-CM

## 2025-01-14 DIAGNOSIS — Z30.09 VASECTOMY EVALUATION: ICD-10-CM

## 2025-01-14 DIAGNOSIS — E66.812 CLASS 2 OBESITY DUE TO EXCESS CALORIES WITHOUT SERIOUS COMORBIDITY WITH BODY MASS INDEX (BMI) OF 35.0 TO 35.9 IN ADULT: ICD-10-CM

## 2025-01-14 DIAGNOSIS — I82.442 ACUTE DEEP VEIN THROMBOSIS (DVT) OF TIBIAL VEIN OF LEFT LOWER EXTREMITY (HCC): ICD-10-CM

## 2025-01-14 DIAGNOSIS — Z13.1 SCREENING FOR DIABETES MELLITUS: ICD-10-CM

## 2025-01-14 DIAGNOSIS — E66.09 CLASS 2 OBESITY DUE TO EXCESS CALORIES WITHOUT SERIOUS COMORBIDITY WITH BODY MASS INDEX (BMI) OF 35.0 TO 35.9 IN ADULT: ICD-10-CM

## 2025-01-14 PROCEDURE — 99213 OFFICE O/P EST LOW 20 MIN: CPT | Performed by: FAMILY MEDICINE

## 2025-01-14 PROCEDURE — 99395 PREV VISIT EST AGE 18-39: CPT | Performed by: FAMILY MEDICINE

## 2025-01-14 NOTE — PROGRESS NOTES
The 21st Century Cures Act makes medical notes like these available to patients in the interest of transparency. Please be advised this is a medical document. Medical documents are intended to carry relevant information, facts as evident, and the clinical opinion of the practitioner. The medical note is intended as peer to peer communication and may appear blunt or direct. It is written in medical language and may contain abbreviations or verbiage that are unfamiliar.     Leonardo Escobar is a 34 year old male who is here for   Chief Complaint   Patient presents with    Well Adult     Reviewed Preventative/Wellness form with patient.        HPI:     This 34-year-old male presents to the office for his annual wellness exam and follow-up on his medical conditions.    The patient was diagnosed with a DVT in the left leg on 12/6/2024.  He is currently on Xarelto 20 mg daily.  He had been seen by hematology 1/6/2025.  Recommendations are the patient complete 6 months of full dose anticoagulation Xarelto 20 mg daily for treatment of the left posterior tibial vein DVT.  The patient would then start low-dose aspirin 81 mg for DVT prevention.  The patient is scheduled for repeat venous Doppler in July and has a follow-up visit with hematology in July.  He is currently denying any nosebleeds, blood in the urine or stool.    The patient is requesting a referral to a urologist because he would be interested in getting a vasectomy.    He has had recent sore throat and cough without any associated fever, chills, runny nose, ear pain, chest pain, shortness of breath, nausea, vomiting, diarrhea.  He is not currently taking anything for his symptoms.    He states he is taking omega-3 fatty acids occasionally and wants to know what dose he should be taking.  He would also like to start a multivitamin and is asking for recommendations.    Exercise:  physical labor .  Diet: watches somewhat  Sleep: 8 hours     Depression/Anxiety:  PHQ-2  SCORE: 0  , done 1/14/2025   Last [place] Suicide Screening on 1/14/2025 was No Risk.       Fall Risk: No falls last 3 months  Gun in home:yes            Gun safe:Yes  Glasses/contacts:Contact             Recent eye doctor visit:yes   Hearing aids:No    Family History for:  Testicular CA - No   Prostate CA - No                               Colon CA - No    Colonoscopy:  No      Past Medical History:    DVT (deep venous thrombosis) (HCC)    Lumbago with sciatica, left side       History reviewed. No pertinent surgical history.    Family History   Problem Relation Age of Onset    Diabetes Mother     Other (Other) Father         gout    Heart Disease Maternal Grandfather        Social History     Socioeconomic History    Marital status:    Tobacco Use    Smoking status: Never     Passive exposure: Never    Smokeless tobacco: Never   Vaping Use    Vaping status: Former   Substance and Sexual Activity    Alcohol use: Yes     Comment: occasional    Drug use: Not Currently     Types: Cannabis    Sexual activity: Yes     Partners: Female     Works as a manager at [employer]    Medications Ordered Prior to Encounter[1]    Allergies[2]    REVIEW OF SYSTEMS:     ROS is negative except as stated in HPI.    EXAM:   /80 (BP Location: Left arm, Patient Position: Sitting, Cuff Size: large)   Pulse 64   Temp 98.1 °F (36.7 °C)   Resp 18   Ht 5' 11\" (1.803 m)   Wt 258 lb (117 kg)   SpO2 99%   BMI 35.98 kg/m²     Wt Readings from Last 3 Encounters:   01/14/25 258 lb (117 kg)   01/06/25 256 lb (116.1 kg)   12/12/24 265 lb (120.2 kg)     BP Readings from Last 3 Encounters:   01/14/25 118/80   01/06/25 140/88   12/12/24 136/84        Visual Acuity  Right Eye Visual Acuity: Uncorrected Right Eye Chart Acuity: 20/25   Left Eye Visual Acuity: Uncorrected Left Eye Chart Acuity: 20/25   Both Eyes Visual Acuity: Uncorrected Both Eyes Chart Acuity: 20/25   Able To Tolerate Visual Acuity: Yes      Weight is down 7 # from  12/12/2024 OV    General: WH/obese/WD, in NAD, A and O times 3  HEAD: Normocephalic, atraumatic  EYES: RODRIGUE, EOMI, conjunctiva normal, sclera anicteric  EARS: Tympanic membranes normal, EAC's normal.  NOSE: Turbninates normal, no bleeding noted.  PHARYNX: Mildly erythematous, no exudates are seen, mucous membrane moist, airway patent.  NECK:  No cervical lymphadenopathy or thyromegaly, normal ROM, no bruits noted.  HEART: Regular rate and rhythm, no S3, S4 or murmur noted.  LUNGS: Clear to ausculation. No retractions or tachypnea noted.  ABDOMEN: Soft, obese, nontender, no guarding, rigidity or rebound, no masses or hepatosplenomegaly, normal bowel sounds in all four quadrants.  : deferred  BACK: No tenderness over the thoracic or lumbar spine. No scoliosis noted.  EXTREMITIES: No clubbing, cyanosis, edema noted. Motor strength +5/5 in all 4 extremities. DTR's +2/4 in all 4 extremities. No left calf tenderness today.  SKIN: Warm and dry.  NEURO: Cr. N. II-XII intact, normal gait  PSYCH: Mood and affect are normal.      ASSESSMENT AND PLAN:     1. Wellness examination  -We discussed the following:  Healthy diet and exercise, immunizations, cancer screening.  I answered the patient's questions regarding taking omega-3 fatty acids and a multivitamin.    2. Laboratory exam ordered as part of routine general medical examination    - Lipid Panel  - TSH W Reflex To Free T4  - HCV Antibody    3. Screening for diabetes mellitus    - HGB A1C [496] [Q]    4. Acute deep vein thrombosis (DVT) of tibial vein of left lower extremity (HCC)    The patient has been evaluated by hematology who has recommended 6 months of Xarelto 20 mg daily and then switching to low-dose aspirin 81 mg daily for DVT prevention.  He has a repeat ultrasound ordered for July and he will follow-up with the hematologist in July.  The patient was advised if he has a prolonged nosebleed lasting longer than 30 minutes despite applying direct pressure or has  blood in the urine or stool, then he should be seen in the emergency room.    5. Pharyngitis, unspecified etiology    Symptomatic treatment with Tylenol, salt water gargles and Chloraseptic was reviewed.  He was advised he may take DayQuil or NyQuil or Mucinex as needed for cough.  If he is developing fever or any worsening symptoms, he should follow-up in the office.    6. Elevated LFTs    CMP done on 12/03/2024 shows mild elevation of the AST at 40 and ALT is 68.  I did advise the patient to stop drinking any alcohol and I will be rechecking his CMP.    - Comp Metabolic Panel (14)    7. Vasectomy evaluation    The patient is given a referral to  for vasectomy evaluation.  I did tell him it would be preferable if he waited until he had completed his Xarelto.    - Urology Referral - In Network    8. Class 2 obesity due to excess calories without serious comorbidity with body mass index (BMI) of 35.0 to 35.9 in adult    I encouraged the patient to be better about his diet and increase his activity.         Health maintenance:    Health Maintenance   Topic Date Due    Annual Physical  01/14/2026    Annual Depression Screening  Completed    Influenza Vaccine (1) 06/30/2025 (Originally 10/1/2024)    COVID-19 Vaccine (3 - 2024-25 season) 12/12/2025 (Originally 9/1/2024)    DTaP,Tdap,and Td Vaccines (2 - Td or Tdap) 07/22/2029    Pneumococcal Vaccine: Birth to 50yrs  Aged Out    Meningococcal B Vaccine  Aged Out         Orders This Visit:  Orders Placed This Encounter   Procedures    Comp Metabolic Panel (14)    Lipid Panel    TSH W Reflex To Free T4    HCV Antibody    HGB A1C [496] [Q]       Meds This Visit:  Requested Prescriptions      No prescriptions requested or ordered in this encounter       Imaging & Referrals:  UROLOGY - INTERNAL     The patient indicates understanding of these issues and agrees to the plan.  The patient is asked to return pending lab results  Paige Kincaid DO    I spent a total of  45 minutes including precharting, H&P, plan of care    This dictation was performed with a verbal recognition program (DRAGON) and it was checked for errors. It is possible that there are still dictated errors within this office note. If so, please bring any errors to my attention for an addendum. All efforts were made to ensure that this office note is accurate         [1]   Current Outpatient Medications on File Prior to Visit   Medication Sig Dispense Refill    rivaroxaban (XARELTO) 20 MG Oral Tab Take 1 tablet (20 mg total) by mouth daily with food. 30 tablet 4     No current facility-administered medications on file prior to visit.   [2] No Known Allergies

## 2025-01-14 NOTE — PATIENT INSTRUCTIONS
Go to the [facility] lab for your fasting blood tests. Do not eat or drink except for water for at least 8 hours prior to the blood tests. Do not take any vitamins or Biotin for 3 days before your blood tests.    Continue the Xarelto 20 mg daily.  If you have a nose bleed which lasts longer than 30 minutes despite direct pressure, blood in the urine or stool, you should be seen in the ER.    Schedule your appointment with the urologist when you are ready to get your vasectomy.    You may take a multivitamin once daily.    Recommendations for exercise are 3-5 times weekly for 30-60 minutes for a minimum of 150-300 minutes.     I will contact you with your test results once available.

## (undated) NOTE — LETTER
Date & Time: 12/6/2024, 10:28 AM  Patient: Leonardo Escobar  Encounter Provider(s):    Dominga Arias DO       To Whom It May Concern:    Leonardo Escobar was seen and treated in our department on 12/6/2024. He can return to work 12/7/2024.    If you have any questions or concerns, please do not hesitate to call.        _____________________________  Physician/APC Signature

## (undated) NOTE — LETTER
Date & Time: 10/29/2022, 11:51 AM  Patient: Mat Soto  Encounter Provider(s):    Rey Nuñez MD       To Whom It May Concern:    Mat Soto was seen and treated in our department on 10/29/2022. He can return to work on Wednesday 11/2/2022 if feeling better.     If you have any questions or concerns, please do not hesitate to call.        _____________________________  Physician/APC Signature